# Patient Record
Sex: FEMALE | Race: WHITE | Employment: UNEMPLOYED | URBAN - METROPOLITAN AREA
[De-identification: names, ages, dates, MRNs, and addresses within clinical notes are randomized per-mention and may not be internally consistent; named-entity substitution may affect disease eponyms.]

---

## 2017-01-26 PROBLEM — K64.4 RESIDUAL HEMORRHOIDAL SKIN TAGS: Status: ACTIVE | Noted: 2017-01-26

## 2018-01-31 PROCEDURE — 88175 CYTOPATH C/V AUTO FLUID REDO: CPT | Performed by: OBSTETRICS & GYNECOLOGY

## 2018-09-10 ENCOUNTER — OFFICE VISIT (OUTPATIENT)
Dept: SURGERY | Facility: CLINIC | Age: 43
End: 2018-09-10
Payer: COMMERCIAL

## 2018-09-10 VITALS
HEIGHT: 63 IN | WEIGHT: 130 LBS | TEMPERATURE: 98 F | DIASTOLIC BLOOD PRESSURE: 66 MMHG | SYSTOLIC BLOOD PRESSURE: 103 MMHG | BODY MASS INDEX: 23.04 KG/M2 | HEART RATE: 61 BPM

## 2018-09-10 DIAGNOSIS — K64.4 INFLAMED EXTERNAL HEMORRHOID: Primary | ICD-10-CM

## 2018-09-10 PROCEDURE — 99244 OFF/OP CNSLTJ NEW/EST MOD 40: CPT | Performed by: SURGERY

## 2018-09-10 NOTE — H&P
New Patient Visit Note       Active Problems      1.  Inflamed external hemorrhoid        Chief Complaint   Patient presents with:  Hemorrhoids: THROMBOSED HEMORRHOID REF BY DR Chano Gay  Growth: skin tags      History of Present Illness   The patient is a 42 knee  No date: SPECIAL SERVICE OR REPORT      Comment:  breast augmentation, breast lift, abdominalplasty                revision  No date: SPECIAL SERVICE OR REPORT; Right      Comment:  al reconstruction  No date: TONSILLECTOMY  4/2016: TOTAL ABDOM HYSTE Review of Systems  The Review of Systems has been reviewed by me during today. Review of Systems   Constitutional: Negative for chills, diaphoresis, fatigue, fever and unexpected weight change.    HENT: Negative for hearing loss, nosebleeds, sore throat Comments: No Rectocele  No Rectovaginal Fistula  No Episiotomy  Anal Sphincter Intact  No Pruritis Ani  No Lichenification  No Abscess  No Fistula in ano  No Anterior Fissure  No Posterior Fissure     Neurological: She is alert and oriented to person, plac

## 2018-11-05 PROBLEM — Z83.71 FAMILY HISTORY OF COLONIC POLYPS: Status: ACTIVE | Noted: 2018-11-05

## 2018-11-05 PROBLEM — Z12.11 SPECIAL SCREENING FOR MALIGNANT NEOPLASM OF COLON: Status: ACTIVE | Noted: 2018-11-05

## 2018-11-09 PROCEDURE — 88304 TISSUE EXAM BY PATHOLOGIST: CPT | Performed by: SURGERY

## 2018-11-16 ENCOUNTER — TELEPHONE (OUTPATIENT)
Dept: SURGERY | Facility: CLINIC | Age: 43
End: 2018-11-16

## 2018-11-16 NOTE — TELEPHONE ENCOUNTER
Pt had hemorrhoidectomy 11/9, forgot to take miralax and had hard BM and felt it tear. Pt is RN and durabonded the area. Bleeding has stopped. Instructed to call if change in condition and post op appt is made.

## 2018-11-26 ENCOUNTER — OFFICE VISIT (OUTPATIENT)
Dept: SURGERY | Facility: CLINIC | Age: 43
End: 2018-11-26

## 2018-11-26 VITALS
WEIGHT: 130 LBS | BODY MASS INDEX: 23.04 KG/M2 | TEMPERATURE: 98 F | DIASTOLIC BLOOD PRESSURE: 79 MMHG | SYSTOLIC BLOOD PRESSURE: 119 MMHG | HEIGHT: 63 IN | HEART RATE: 92 BPM

## 2018-11-26 DIAGNOSIS — K64.4 RESIDUAL HEMORRHOIDAL SKIN TAGS: Primary | ICD-10-CM

## 2018-11-26 PROBLEM — Z12.11 SPECIAL SCREENING FOR MALIGNANT NEOPLASM OF COLON: Status: RESOLVED | Noted: 2018-11-05 | Resolved: 2018-11-26

## 2018-11-26 PROCEDURE — 99024 POSTOP FOLLOW-UP VISIT: CPT | Performed by: SURGERY

## 2018-11-26 RX ORDER — POLYETHYLENE GLYCOL 3350 17 G/17G
17 POWDER, FOR SOLUTION ORAL DAILY
COMMUNITY
End: 2019-04-26 | Stop reason: ALTCHOICE

## 2018-11-26 NOTE — PROGRESS NOTES
Post Operative Visit Note       Active Problems  1.  Residual hemorrhoidal skin tags         Chief Complaint   Patient presents with:  Post-Op: 11/9 hemorrhoidectomy, pt c/o of slight d/c denies fever         History of Present Illness   The patient is a 42 Diabetes Father    • Heart Disease Father 61        mi    • Heart Attack Father    • Hypertension Mother    • Colon Polyps Mother    • Other (epilepsy) Maternal Grandmother    • Stroke Maternal Grandmother    • Cancer Paternal Grandmother    • Cancer Pater abdominal distention, abdominal pain, anal bleeding, blood in stool, constipation, diarrhea, nausea and vomiting. Genitourinary: Negative for difficulty urinating, dysuria, frequency and urgency. Musculoskeletal: Negative for arthralgias and myalgias. bowel movement its once a day without straining. · I asked her to follow-up with me as needed. Follow Up  Return if symptoms worsen or fail to improve.     Guadalupe Hendricks MD

## 2019-02-06 PROCEDURE — 88175 CYTOPATH C/V AUTO FLUID REDO: CPT | Performed by: OBSTETRICS & GYNECOLOGY

## 2019-03-04 PROCEDURE — 36415 COLL VENOUS BLD VENIPUNCTURE: CPT | Performed by: INTERNAL MEDICINE

## 2019-03-04 PROCEDURE — 86787 VARICELLA-ZOSTER ANTIBODY: CPT | Performed by: INTERNAL MEDICINE

## 2019-06-26 PROBLEM — G89.29 CHRONIC RADICULAR CERVICAL PAIN: Status: ACTIVE | Noted: 2019-06-26

## 2019-06-26 PROBLEM — M54.12 CHRONIC RADICULAR CERVICAL PAIN: Status: ACTIVE | Noted: 2019-06-26

## (undated) NOTE — Clinical Note
I had the pleasure of seeing Malissa Minikalpesh on 11/26/2018. Please see my attached note. Juan Cowan MD [facility]--Surgery

## (undated) NOTE — Clinical Note
I had the pleasure of seeing Rolly Greer on 9/10/2018. Please see my attached note. Lori Yen MD [facility]--Surgery